# Patient Record
Sex: FEMALE | Race: BLACK OR AFRICAN AMERICAN | NOT HISPANIC OR LATINO | Employment: UNEMPLOYED | URBAN - METROPOLITAN AREA
[De-identification: names, ages, dates, MRNs, and addresses within clinical notes are randomized per-mention and may not be internally consistent; named-entity substitution may affect disease eponyms.]

---

## 2022-05-18 ENCOUNTER — APPOINTMENT (EMERGENCY)
Dept: RADIOLOGY | Facility: HOSPITAL | Age: 62
End: 2022-05-18
Payer: COMMERCIAL

## 2022-05-18 ENCOUNTER — HOSPITAL ENCOUNTER (EMERGENCY)
Facility: HOSPITAL | Age: 62
Discharge: HOME | End: 2022-05-19
Attending: EMERGENCY MEDICINE
Payer: COMMERCIAL

## 2022-05-18 DIAGNOSIS — R45.851 SUICIDAL IDEATION: Primary | ICD-10-CM

## 2022-05-18 LAB
ALBUMIN SERPL BCP-MCNC: 4.2 G/DL (ref 3.5–5)
ALP SERPL-CCNC: 74 U/L (ref 34–104)
ALT SERPL W P-5'-P-CCNC: 12 U/L (ref 7–52)
AMPHETAMINES SERPL QL SCN: NEGATIVE
ANION GAP SERPL CALCULATED.3IONS-SCNC: 6 MMOL/L (ref 4–13)
AST SERPL W P-5'-P-CCNC: 15 U/L (ref 13–39)
BARBITURATES UR QL: NEGATIVE
BASOPHILS # BLD AUTO: 0.02 THOUSANDS/ΜL (ref 0–0.1)
BASOPHILS NFR BLD AUTO: 1 % (ref 0–1)
BENZODIAZ UR QL: NEGATIVE
BILIRUB SERPL-MCNC: 0.46 MG/DL (ref 0.2–1)
BUN SERPL-MCNC: 14 MG/DL (ref 5–25)
CALCIUM SERPL-MCNC: 9.3 MG/DL (ref 8.4–10.2)
CHLORIDE SERPL-SCNC: 106 MMOL/L (ref 96–108)
CO2 SERPL-SCNC: 27 MMOL/L (ref 21–32)
COCAINE UR QL: NEGATIVE
CREAT SERPL-MCNC: 0.64 MG/DL (ref 0.6–1.3)
EOSINOPHIL # BLD AUTO: 0.04 THOUSAND/ΜL (ref 0–0.61)
EOSINOPHIL NFR BLD AUTO: 1 % (ref 0–6)
ERYTHROCYTE [DISTWIDTH] IN BLOOD BY AUTOMATED COUNT: 15.3 % (ref 11.6–15.1)
ETHANOL EXG-MCNC: 0 MG/DL
GFR SERPL CREATININE-BSD FRML MDRD: 97 ML/MIN/1.73SQ M
GLUCOSE SERPL-MCNC: 74 MG/DL (ref 65–140)
HCT VFR BLD AUTO: 36.1 % (ref 34.8–46.1)
HGB BLD-MCNC: 11.2 G/DL (ref 11.5–15.4)
IMM GRANULOCYTES # BLD AUTO: 0.01 THOUSAND/UL (ref 0–0.2)
IMM GRANULOCYTES NFR BLD AUTO: 0 % (ref 0–2)
LYMPHOCYTES # BLD AUTO: 1.13 THOUSANDS/ΜL (ref 0.6–4.47)
LYMPHOCYTES NFR BLD AUTO: 26 % (ref 14–44)
MCH RBC QN AUTO: 25.6 PG (ref 26.8–34.3)
MCHC RBC AUTO-ENTMCNC: 31 G/DL (ref 31.4–37.4)
MCV RBC AUTO: 82 FL (ref 82–98)
METHADONE UR QL: NEGATIVE
MONOCYTES # BLD AUTO: 0.45 THOUSAND/ΜL (ref 0.17–1.22)
MONOCYTES NFR BLD AUTO: 10 % (ref 4–12)
NEUTROPHILS # BLD AUTO: 2.74 THOUSANDS/ΜL (ref 1.85–7.62)
NEUTS SEG NFR BLD AUTO: 62 % (ref 43–75)
NRBC BLD AUTO-RTO: 0 /100 WBCS
OPIATES UR QL SCN: NEGATIVE
OXYCODONE+OXYMORPHONE UR QL SCN: NEGATIVE
PCP UR QL: NEGATIVE
PLATELET # BLD AUTO: 291 THOUSANDS/UL (ref 149–390)
PMV BLD AUTO: 9.3 FL (ref 8.9–12.7)
POTASSIUM SERPL-SCNC: 3.9 MMOL/L (ref 3.5–5.3)
PROT SERPL-MCNC: 7 G/DL (ref 6.4–8.4)
RBC # BLD AUTO: 4.38 MILLION/UL (ref 3.81–5.12)
SODIUM SERPL-SCNC: 139 MMOL/L (ref 135–147)
THC UR QL: NEGATIVE
TSH SERPL DL<=0.05 MIU/L-ACNC: 1.49 UIU/ML (ref 0.45–4.5)
WBC # BLD AUTO: 4.39 THOUSAND/UL (ref 4.31–10.16)

## 2022-05-18 PROCEDURE — 80307 DRUG TEST PRSMV CHEM ANLYZR: CPT

## 2022-05-18 PROCEDURE — 73110 X-RAY EXAM OF WRIST: CPT

## 2022-05-18 PROCEDURE — 99285 EMERGENCY DEPT VISIT HI MDM: CPT

## 2022-05-18 PROCEDURE — 73130 X-RAY EXAM OF HAND: CPT

## 2022-05-18 PROCEDURE — 84443 ASSAY THYROID STIM HORMONE: CPT

## 2022-05-18 PROCEDURE — 99285 EMERGENCY DEPT VISIT HI MDM: CPT | Performed by: PHYSICIAN ASSISTANT

## 2022-05-18 PROCEDURE — 80053 COMPREHEN METABOLIC PANEL: CPT

## 2022-05-18 PROCEDURE — 85025 COMPLETE CBC W/AUTO DIFF WBC: CPT

## 2022-05-18 PROCEDURE — 93005 ELECTROCARDIOGRAM TRACING: CPT

## 2022-05-18 PROCEDURE — 82075 ASSAY OF BREATH ETHANOL: CPT

## 2022-05-18 PROCEDURE — 36415 COLL VENOUS BLD VENIPUNCTURE: CPT

## 2022-05-18 RX ORDER — DIPHENHYDRAMINE HCL 25 MG
50 TABLET ORAL ONCE
Status: COMPLETED | OUTPATIENT
Start: 2022-05-18 | End: 2022-05-18

## 2022-05-18 RX ORDER — SUCRALFATE 1 G/1
1 TABLET ORAL 4 TIMES DAILY
COMMUNITY

## 2022-05-18 RX ORDER — OMEPRAZOLE 40 MG/1
40 CAPSULE, DELAYED RELEASE ORAL DAILY
COMMUNITY

## 2022-05-18 RX ADMIN — DIPHENHYDRAMINE HCL: 25 TABLET, COATED ORAL at 22:48

## 2022-05-18 NOTE — ED NOTES
The patient is a 56-year-old female who arrived to the emergency department accompanied by staff from [facility], a sober living facility for women  The patient reported that she is a [place] resident and that she was working with [facility] towards sobriety and as a result of their efforts she was referred to [facility], and arrived there last Thursday  The patient reports that this was not which she expected and she feels that her mental health has sharply declined since time  She reports that she feels so depressed, hopeless, and helpless that she has had suicidal ideas and thoughts of relapsing and or reusing substances  She reports that the environment of the home is not conducive to her mental health as there are many female addicts there with trauma history all struggling to maintain sobriety  She reports that she has thought about going out in getting a bottle of vodka and drinking at all then doing something stupid  When asked for specific suicidal plan she refuses to disclose  She does report past suicide attempts by overdose, cutting her wrists, and jumping into a frozen like  She reports that attempts occurred many years ago and denies recent suicide attempts or self-injurious behaviors  The patient denies any homicidal ideas overtly, but she does admit to aggressive feelings towards the other women at the facility and has thought about being violent towards them, but she assures that she has not acted on this  There is no homicidal intent, she just feels frustrated and enraged at times  The patient does endorse depression  She feels hopeless, helpless, and a loss of control  She feels self pity and indicates that she does not like the environment as she feels it is oppressive  The patient reports anxiety  She denies auditory and visual hallucinations    She denies delusions or paranoid thinking overtly, but does indicate a general miss trust of law enforcement, social services, and the mental health programs based on past experiences  The patient reports poor sleep, especially since coming to the sober living residence  She reports that she is unable to sleep there  She also reports that she did not eat for 5 days and identifies this as a self-harming tactic because she was displeased with the situation  She was noted to eat here in the emergency department without any issue  The patient did reported 15-20 lb weight loss in the past 6 months  The patient is known to abuse alcohol  She reports that prior to 10 days ago when she sought treatment she was drinking vodka and smoking marijuana on a daily basis  She recently relapsed with crack cocaine and used that 10 days ago as well  This is a stressor as is [place] [facility]  She also reports remote stressors of chronic mental health issues with repeated hospitalizations, substance abuse with rehabilitation and detox stays, homelessness, and family losses  The patient reports that she was raised in foster care but treated very well  One of her sons was killed 4 years ago  This is a stressor  The patient has not been involved in mental health treatment or taking medications for several years  She feels that at this time she needs to consider resuming her mental health treatment given the severity of her most recent decompensation  The patient voices a desire to return to [place] and requests that a referral be made to [facility] where she was most recently involved in treatment and services  The patient signed a 201 voluntary consent for inpatient psychiatric treatment  She was advised that this will hold her in the emergency department until a [place] 7 day form can be completed for the accepting facility
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
## 2022-05-18 NOTE — ED NOTES
Patient specifically requested a referral to [facility] in [place], [place]  Call was placed: [phone]      Voice mail messages were left for Nurse Manager, Jesús Matta, and for , Jassi Reyes, requesting a return call for direction on how to submit a referral

## 2022-05-19 VITALS
WEIGHT: 115 LBS | HEART RATE: 92 BPM | RESPIRATION RATE: 18 BRPM | DIASTOLIC BLOOD PRESSURE: 80 MMHG | SYSTOLIC BLOOD PRESSURE: 129 MMHG | OXYGEN SATURATION: 97 % | TEMPERATURE: 98 F | HEIGHT: 61 IN | BODY MASS INDEX: 21.71 KG/M2

## 2022-05-19 RX ORDER — DIPHENHYDRAMINE HCL 25 MG
25 TABLET ORAL ONCE
Status: COMPLETED | OUTPATIENT
Start: 2022-05-19 | End: 2022-05-19

## 2022-05-19 RX ADMIN — DIPHENHYDRAMINE HCL 25 MG: 25 TABLET, COATED ORAL at 03:11

## 2022-05-19 NOTE — ED NOTES
Patient left many belongings in room  Went outside to where patient was standing  Told her about her belongings and she stated, "I can't take it, too much to carry " This RN offered to get her another bag, patient refused said she did not want it  Ivon Velozight was witnessed by colleague Marta Dietz  Patient belongings bagged and labeled  Taken to charge desk       Marlene Lutz, SATURNINO  05/19/22 5735

## 2022-05-19 NOTE — ED NOTES
Pt awake and irritable  Pt c/o of not being able to sleep and needing more benadryl  Pt states that she does not want melatonin   Provider aware     Octaviano Driscoll RN  05/19/22 9746

## 2022-05-19 NOTE — ED NOTES
Patient called this RN to bedside stating that if there is not a bed at a facility in [place] she wants to sign out against medical advice  She does not want "to get back into the system and end up in a state hospital for 18 months " Patient states she had been doing well, working and everything until she ended up here       Les Pedro RN  05/19/22 7694

## 2022-05-19 NOTE — ED NOTES
Crisis Worker (CW) called [address] in [place], [place]  at  [phone] and per Leslie Tan at [facility] they do not have any available beds at this time      Other bed search efforts:  [facility] Pingwyn  [place]-left message  Winlock Robards-after option-busy signal  [facility] @ [employer]-mailbox is full  [employer], per Kalyan[facility]- no beds, per Nicole Grimaldo in [facility] is closed  [facility] geriatric, per Virgil Kasper and  [place] in [facility] list on all ages, per Mitchel Brown is full  Radha-call cannot be completed

## 2022-05-19 NOTE — ED CARE HANDOFF
Emergency Department Sign Out Note        Sign out and transfer of care from Dr Watts Rota  al  See Separate Emergency Department note  The patient, Lavonne Diop, was evaluated by the previous provider for suicidal ideation  Workup Completed:  yes    ED Course / Workup Pending (followup):  201 placement as per crisis  Signed out to Dr Yusef Botello in AM                                       Procedures  MDM        Disposition  Final diagnoses:   Suicidal ideation     Time reflects when diagnosis was documented in both MDM as applicable and the Disposition within this note     Time User Action Codes Description Comment    5/19/2022  2:13 AM Roscoe Mead Add [J99 [id number]] Suicidal ideation       ED Disposition     None      Follow-up Information    None       Patient's Medications   Discharge Prescriptions    No medications on file     No discharge procedures on file         ED Provider  Electronically Signed by     Gaudencio Limon MD  05/19/22 1964

## 2022-05-19 NOTE — ED NOTES
Pt awake, calm and cooperative  Provided with drink, no other needs at this time  1:1 sitter present   Will continue to monitor     Casie Simms RN  05/19/22 7330

## 2022-05-19 NOTE — ED NOTES
Patient medications were placed in locker with belongings by previous RN  Medications returned to patient       Aracelis Paez RN  05/19/22 5620

## 2022-05-19 NOTE — ED NOTES
This writer discussed the patients current presentation and recommended discharge plan with Dr Jovana Joyner  They agree with the patient being discharged at this time with referrals and/or information about: hotline / warm line numbers; walk-in clinic information; local outpatient resource list for therapists / counselors, psychologists, psychiatrists, and partial programs; and, online / internet resources and support groups  Advised to utilize natural and existing supports  Advised for safety planning and effective coping skills  Advised to return to ED if necessary  Patient is a [facility] resident and we have limited resources for that region, but she was encouraged to utilize previously accessed supports and services in that area when she returns (which is her voiced intent)  [place] Crisis Number: Sue Westbrook [phone]  National Suicide Hotline: 9-651.103.2897  Crisis Text Line: Text Connect to 948070      The patient was Instructed to follow up with their established providers  This writer and the patient completed a safety plan  The patient was provided with a copy of their safety plan with encouragement to utilize the plan following discharge  In addition, the patient was instructed to call [facility] crisis, other crisis services, 911 or to go to the nearest ER immediately if their situation changes at any time  This writer discussed discharge plans with the patient, who denies all lethality, and agrees with and understands the discharge plans           SAFETY PLAN  Warning Signs (thoughts, images, mood, behavior, situations) of a potential crisis: feeling deceived, losing independence / control, crying, talking faster, increased volume      Coping Skills (what can I do to take my mind off the problem, or to keep myself safe): pray, read, have a snack      Outside Support (who can I reach out to for support and help): Harleen Cerna, sponsors (AA), spiritual leaders / advisors        Celeste Stauffer Prevention Hotline:  [address] [facility] 7-300-587-812-176-1072 - LVF Crisis/Mobile Crisis   [address]: [phone]  [place]: [place] [address] [facility] [address] [place] [address] [address] [phone] - Crisis   [phone] - Peer Support Talk Line (1-9pm daily)  [phone] - Teen Support Talk Line (1-9pm daily)  [address] ([place]) [phone] - [address] [facility]

## 2022-05-19 NOTE — ED NOTES
Upon arrival this RN finds patient agitated and yelling that she knows her rights  Oncoming hospital sitter turned lights on and opened room curtain while patient wanted the curtain closed and room dark  Staff was switched out, patient calmed and provided a beverage  New hospital sitter in place, patient is now calm  Lights out, curtain pulled for privacy and hospital sitter at bedside        Comp, RN  05/19/22 6230

## 2022-05-19 NOTE — DISCHARGE INSTRUCTIONS
This writer discussed the patients current presentation and recommended discharge plan with Dr Carrero Even  They agree with the patient being discharged at this time with referrals and/or information about: hotline / warm line numbers; walk-in clinic information; local outpatient resource list for therapists / counselors, psychologists, psychiatrists, and partial programs; and, online / internet resources and support groups  Advised to utilize natural and existing supports  Advised for safety planning and effective coping skills  Advised to return to ED if necessary  Patient is a [facility] resident and we have limited resources for that region, but she was encouraged to utilize previously accessed supports and services in that area when she returns (which is her voiced intent)  [place] Crisis Number: Ralph Herndon [phone]  National Suicide Hotline: 2-867.708.7082  Crisis Text Line: Text Connect to 350036      The patient was Instructed to follow up with their established providers  This writer and the patient completed a safety plan  The patient was provided with a copy of their safety plan with encouragement to utilize the plan following discharge  In addition, the patient was instructed to call [facility] crisis, other crisis services, 911 or to go to the nearest ER immediately if their situation changes at any time  This writer discussed discharge plans with the patient, who denies all lethality, and agrees with and understands the discharge plans           SAFETY PLAN  Warning Signs (thoughts, images, mood, behavior, situations) of a potential crisis: feeling deceived, losing independence / control, crying, talking faster, increased volume      Coping Skills (what can I do to take my mind off the problem, or to keep myself safe): pray, read, have a snack      Outside Support (who can I reach out to for support and help): Dina Gorman, sponsors (AA), spiritual leaders / advisors        Bernarda Peterson Prevention Hotline:  [address] [facility] 7-198-702-138-470-3610 - LVF Crisis/Mobile Crisis   [address]: [phone]  [place]: [place] [address] [facility] [address] [place] [address] [address] [phone] - Crisis   [phone] - Peer Support Talk Line (1-9pm daily)  [phone] - Teen Support Talk Line (1-9pm daily)  [address] ([address] [place] [address]) [phone] - [facility]

## 2022-05-19 NOTE — ED NOTES
Pharmacy called for patient medication return     Marisa Hatfield, [address]  05/19/22 6758    Pharmacy states they do not have medication for patient  Envelope on chart [id number] has patient label on it and states medication       Marisa Hatfield RN  05/19/22 0668

## 2022-05-20 LAB
ATRIAL RATE: 74 BPM
P AXIS: 50 DEGREES
PR INTERVAL: 136 MS
QRS AXIS: 76 DEGREES
QRSD INTERVAL: 74 MS
QT INTERVAL: 364 MS
QTC INTERVAL: 404 MS
T WAVE AXIS: 71 DEGREES
VENTRICULAR RATE: 74 BPM

## 2022-05-20 PROCEDURE — 93010 ELECTROCARDIOGRAM REPORT: CPT | Performed by: INTERNAL MEDICINE

## 2024-04-11 ENCOUNTER — HOSPITAL ENCOUNTER (EMERGENCY)
Facility: HOSPITAL | Age: 64
Discharge: HOME | End: 2024-04-11
Attending: EMERGENCY MEDICINE

## 2024-04-11 VITALS
HEART RATE: 94 BPM | DIASTOLIC BLOOD PRESSURE: 73 MMHG | RESPIRATION RATE: 18 BRPM | BODY MASS INDEX: 21.73 KG/M2 | TEMPERATURE: 97.6 F | OXYGEN SATURATION: 97 % | HEIGHT: 61 IN | SYSTOLIC BLOOD PRESSURE: 147 MMHG

## 2024-04-11 DIAGNOSIS — Z76.0 MEDICATION REFILL: ICD-10-CM

## 2024-04-11 DIAGNOSIS — F99 PSYCHIATRIC ILLNESS: Primary | ICD-10-CM

## 2024-04-11 PROCEDURE — 99284 EMERGENCY DEPT VISIT MOD MDM: CPT | Performed by: EMERGENCY MEDICINE

## 2024-04-11 PROCEDURE — 99281 EMR DPT VST MAYX REQ PHY/QHP: CPT

## 2024-04-11 RX ORDER — OLANZAPINE 5 MG/1
5 TABLET ORAL 2 TIMES DAILY
Qty: 90 TABLET | Refills: 0 | Status: SHIPPED | OUTPATIENT
Start: 2024-04-11

## 2024-04-11 RX ORDER — HYDROXYZINE 50 MG/1
50 TABLET, FILM COATED ORAL EVERY 6 HOURS PRN
Qty: 30 TABLET | Refills: 0 | Status: SHIPPED | OUTPATIENT
Start: 2024-04-11

## 2024-04-11 RX ORDER — MIRTAZAPINE 15 MG/1
15 TABLET, FILM COATED ORAL
Qty: 30 TABLET | Refills: 0 | Status: SHIPPED | OUTPATIENT
Start: 2024-04-11

## 2024-04-18 NOTE — ED PROVIDER NOTES
History  Chief Complaint   Patient presents with    Medication Refill     Needs home meds refilled. Haven't had them because of a recent move and in alcohol rehab. Unsure what medication doses she was on     63 year old female with a past medical history of psychiatric illness and alcohol use disorder who presents for a refill of her medications. The patient states that she was recently admitted for alcohol detox, and when she was discharged she was not given prescriptions for her psychiatric medications. Per chart review, the patient was discharged from  on 3/25 after successfully undergoing detox from alcohol. She was told to continue taking atarax and zyprexa at that time but it does not appear that refills were given. The patient states that she also takes remeron. She does not know the doses of these medications, but she states that she was being seen at the [facility] and that they would have a list of her medications. The patient currently denies any SI/HI or hallucinations, but she states that she is afraid that she will develop symptoms if she does not restart her medications. She denies any recent fevers, chills, chest pain, shortness of breath, abdominal pain, nausea, vomiting, or other concerning symptoms.         Prior to Admission Medications   Prescriptions Last Dose Informant Patient Reported? Taking?   omeprazole (PriLOSEC) 40 MG capsule   Yes No   Sig: Take 40 mg by mouth in the morning.   sucralfate (CARAFATE) 1 g tablet   Yes No   Sig: Take 1 g by mouth in the morning and 1 g at noon and 1 g in the evening and 1 g before bedtime.      Facility-Administered Medications: None       History reviewed. No pertinent past medical history.    Past Surgical History:   Procedure Laterality Date     SECTION         History reviewed. No pertinent family history.  I have reviewed and agree with the history as documented.    E-Cigarette/Vaping    E-Cigarette Use Never User   "    E-Cigarette/Vaping Substances     Social History     Tobacco Use    Smoking status: Every Day     Types: Cigarettes    Smokeless tobacco: Never   Vaping Use    Vaping status: Never Used   Substance Use Topics    Alcohol use: Yes     Comment: states he has not had a drink in 10+days.    Drug use: Yes     Types: \"Crack\" cocaine, Marijuana       Review of Systems   Constitutional:  Negative for chills and fever.   Respiratory:  Negative for shortness of breath.    Cardiovascular:  Negative for chest pain.   Gastrointestinal:  Negative for abdominal pain, nausea and vomiting.   Psychiatric/Behavioral:  Negative for dysphoric mood, hallucinations and suicidal ideas.    All other systems reviewed and are negative.      Physical Exam  Physical Exam  Vitals and nursing note reviewed.   Constitutional:       General: She is awake. She is not in acute distress.     Appearance: She is not toxic-appearing.   HENT:      Head: Normocephalic and atraumatic.   Eyes:      General: Vision grossly intact. Gaze aligned appropriately.   Cardiovascular:      Rate and Rhythm: Normal rate and regular rhythm.   Pulmonary:      Effort: Pulmonary effort is normal. No respiratory distress.   Musculoskeletal:      Cervical back: Full passive range of motion without pain and neck supple.   Skin:     General: Skin is warm and dry.   Neurological:      General: No focal deficit present.      Mental Status: She is alert and oriented to person, place, and time.   Psychiatric:         Attention and Perception: Attention and perception normal.         Mood and Affect: Mood and affect normal.         Behavior: Behavior normal. Behavior is cooperative.         Thought Content: Thought content normal.         Vital Signs  ED Triage Vitals [04/11/24 0902]   Temperature Pulse Respirations Blood Pressure SpO2   97.6 °F (36.4 °C) 94 18 147/73 97 %      Temp src Heart Rate Source Patient Position - Orthostatic VS BP Location FiO2 (%)   -- -- Sitting Right " arm --      Pain Score       --           Vitals:    04/11/24 0902   BP: 147/73   Pulse: 94   Patient Position - Orthostatic VS: Sitting         Visual Acuity      ED Medications  Medications - No data to display    Diagnostic Studies  Results Reviewed       None                   No orders to display              Procedures  Procedures         ED Course  ED Course as of 04/18/24 1025   Thu Apr 11, 2024   0909 [facility]: [phone]  Can talk to Mrs. Maciel.   0916 Per [facility], these are the medications that the patient is supposed to be taking:  Zyprexa 5mg AM, 10mg PM  Remeron 15mg PM  Hydroxyzine 50mg 1-2 Q6H PRN for agitation                                             Medical Decision Making  63 year old female presents for a refill of her medications. She denies any current symptoms. I spoke with the [facility] who confirmed that the patient is supposed to be taking the following medications and dosing: Zyprexa 5mg AM, 10mg every evening, Remeron 15mg every night, and Hydroxyzine 50mg (1-2 pills) Q6H PRN for agitation. Prescriptions for these medications were sent to the patient's pharmacy. She was also given follow up information for family medicine. She was discharged home in stable condition with symptomatic care instructions and strict ED return precautions.     Risk  Prescription drug management.             Disposition  Final diagnoses:   Psychiatric illness   Medication refill     Time reflects when diagnosis was documented in both MDM as applicable and the Disposition within this note       Time User Action Codes Description Comment    4/11/2024  9:24 AM Lindsey Chris [F99] Psychiatric illness     4/11/2024  9:24 AM Lindsey Chris [Z76.0] Medication refill           ED Disposition       ED Disposition   Discharge    Condition   Stable    Date/Time   Thu Apr 11, 2024  9:24 AM    Comment   Leigh Perez discharge to home/self  care.                   Follow-up Information       Follow up With Specialties Details Why Contact Info Additional Information    [facility] Schedule an appointment as soon as possible for a visit   [address] [place] [id number] [phone] [facility], [address] [place] [address], [id number] [phone]    [facility] Emergency Medicine Go to  If symptoms worsen [facility] [address]  [phone] [facility], [address], [place], [address]            Discharge Medication List as of 4/11/2024  9:26 AM        START taking these medications    Details   hydrOXYzine HCL (ATARAX) 50 mg tablet Take 1 tablet (50 mg total) by mouth every 6 (six) hours as needed (agitation), Starting u 4/11/2024, Print      mirtazapine (REMERON) 15 mg tablet Take 1 tablet (15 mg total) by mouth daily at bedtime, Starting u 4/11/2024, Print      OLANZapine (ZyPREXA) 5 mg tablet Take 1 tablet (5 mg total) by mouth 2 (two) times a day Take 1 tablet every morning and 2 tablets every night, Starting u 4/11/2024, Print           CONTINUE these medications which have NOT CHANGED    Details   omeprazole (PriLOSEC) 40 MG capsule Take 40 mg by mouth in the morning., Historical Med      sucralfate (CARAFATE) 1 g tablet Take 1 g by mouth in the morning and 1 g at noon and 1 g in the evening and 1 g before bedtime., Historical Med             No discharge procedures on file.    PDMP Review       None            ED Provider  Electronically Signed by             Lindsey Chris DO  04/23/24 6947